# Patient Record
Sex: FEMALE | Race: WHITE | NOT HISPANIC OR LATINO | Employment: UNEMPLOYED | URBAN - METROPOLITAN AREA
[De-identification: names, ages, dates, MRNs, and addresses within clinical notes are randomized per-mention and may not be internally consistent; named-entity substitution may affect disease eponyms.]

---
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
## 2024-01-08 DIAGNOSIS — Z30.09 BIRTH CONTROL COUNSELING: ICD-10-CM

## 2024-01-08 RX ORDER — DROSPIRENONE AND ETHINYL ESTRADIOL 0.02-3(28)
1 KIT ORAL DAILY
Qty: 28 TABLET | Refills: 0 | Status: SHIPPED | OUTPATIENT
Start: 2024-01-08

## 2024-02-10 DIAGNOSIS — Z30.09 BIRTH CONTROL COUNSELING: ICD-10-CM

## 2024-02-12 RX ORDER — DROSPIRENONE AND ETHINYL ESTRADIOL 0.02-3(28)
1 KIT ORAL DAILY
Qty: 28 TABLET | Refills: 0 | Status: SHIPPED | OUTPATIENT
Start: 2024-02-12

## 2024-06-01 NOTE — ASSESSMENT & PLAN NOTE
Doing well on vyvanse  Continue current meds  Please  your prescription at your local pharmacy and administer on Monday, 6/3, midday.     Please return to the ED if patient develops stridor at rest, excessive drooling, difficulty breathing (retractions, breathing fast), inability to swallow, or decreased urination.     Please provide patient supportive care, such as tylenol or motrin for pain or fever.     May consider humidifier and nasal saline and bulb suction/Nasal Trinidad for congestion.    May consider honey for cough if child is >1 year old. DO NOT GIVE HONEY IN INFANTS <1 YEAR AS THIS IS A RISK FOR BOTULINUM TOXICITY.     Ensure patient is well hydrated. Escalate food intake as tolerated.     If patient is experiencing a cough, this cough may resolve with other symptoms. Very often; however, a cough from a viral infection may persist for up to 2 weeks after initial infection. They are not infective during this time, their lungs are simply taking time to recover from the illness.    If after a 3-5 days, your child's symptoms worsen after initially seeming to improve or do not improve, please consider re-evaluation by your PCP or ED. For example, if your child suddenly appears clinically unwell, such as sudden-onset and significantly worsened difficulty with breathing, increase in vomiting, diarrhea, change in sound of cough, or if he/she begins to spike new high fevers despite tylenol/ motrin dosing, please bring patient to a clinician for reevaluation.

## 2024-11-14 DIAGNOSIS — J45.20 INTERMITTENT ASTHMA, UNSPECIFIED ASTHMA SEVERITY, UNSPECIFIED WHETHER COMPLICATED: ICD-10-CM

## 2024-11-14 DIAGNOSIS — I15.1 HYPERTENSION SECONDARY TO OTHER RENAL DISORDERS: ICD-10-CM

## 2024-11-14 RX ORDER — LISINOPRIL 10 MG/1
10 TABLET ORAL DAILY
Qty: 90 TABLET | Refills: 0 | Status: SHIPPED | OUTPATIENT
Start: 2024-11-14

## 2024-11-14 RX ORDER — ALBUTEROL SULFATE 90 UG/1
2 INHALANT RESPIRATORY (INHALATION) EVERY 4 HOURS PRN
Qty: 18 G | Refills: 0 | Status: SHIPPED | OUTPATIENT
Start: 2024-11-14

## 2024-12-12 NOTE — PATIENT INSTRUCTIONS
1  Hypertension: Will continue on 10 mg for now and have dad check BPs at home  If blood pressures continue to remain elevated, will plan to increase to 15 mg daily  Will plan follow up depending on BP results 
DISPLAY PLAN FREE TEXT
DISPLAY PLAN FREE TEXT

## 2024-12-16 DIAGNOSIS — J45.20 INTERMITTENT ASTHMA, UNSPECIFIED ASTHMA SEVERITY, UNSPECIFIED WHETHER COMPLICATED: ICD-10-CM

## 2024-12-17 RX ORDER — ALBUTEROL SULFATE 90 UG/1
INHALANT RESPIRATORY (INHALATION)
Qty: 18 G | Refills: 0 | Status: SHIPPED | OUTPATIENT
Start: 2024-12-17

## 2025-01-23 ENCOUNTER — ANNUAL EXAM (OUTPATIENT)
Age: 20
End: 2025-01-23
Payer: COMMERCIAL

## 2025-01-23 VITALS
DIASTOLIC BLOOD PRESSURE: 78 MMHG | BODY MASS INDEX: 32.78 KG/M2 | HEIGHT: 63 IN | SYSTOLIC BLOOD PRESSURE: 112 MMHG | WEIGHT: 185 LBS

## 2025-01-23 DIAGNOSIS — Z30.41 SURVEILLANCE OF CONTRACEPTIVE PILL: ICD-10-CM

## 2025-01-23 DIAGNOSIS — Z01.419 ENCOUNTER FOR GYNECOLOGICAL EXAMINATION WITHOUT ABNORMAL FINDING: Primary | ICD-10-CM

## 2025-01-23 PROBLEM — Q61.3 POLYCYSTIC KIDNEY DISEASE: Status: RESOLVED | Noted: 2018-03-24 | Resolved: 2025-01-23

## 2025-01-23 PROBLEM — I15.1 HYPERTENSION SECONDARY TO OTHER RENAL DISORDERS: Status: RESOLVED | Noted: 2019-02-08 | Resolved: 2025-01-23

## 2025-01-23 PROBLEM — R45.4 DIFFICULTY CONTROLLING ANGER: Status: RESOLVED | Noted: 2020-03-10 | Resolved: 2025-01-23

## 2025-01-23 PROCEDURE — S0612 ANNUAL GYNECOLOGICAL EXAMINA: HCPCS | Performed by: NURSE PRACTITIONER

## 2025-01-23 RX ORDER — DROSPIRENONE AND ETHINYL ESTRADIOL 0.02-3(28)
1 KIT ORAL DAILY
Qty: 28 TABLET | Refills: 12 | Status: SHIPPED | OUTPATIENT
Start: 2025-01-23

## 2025-01-23 RX ORDER — LEVONORGESTREL AND ETHINYL ESTRADIOL 0.15-0.03
KIT ORAL
COMMUNITY
Start: 2024-10-02 | End: 2025-01-23 | Stop reason: ALTCHOICE

## 2025-01-23 NOTE — PATIENT INSTRUCTIONS
Patient Education     STI Prevention   About this topic   Sexually transmitted infections or STis are infections you catch during sexual contact. This includes vaginal, oral, and anal sex. You may also get it by coming in contact with skin, genitals, mouth, rectum, or body fluids of an infected person. A person with an STI may not have any signs or know they have it. STIs can be very serious and have long-term health effects. STIs can cause cancer, blindness, or not being able to have children.  Bacteria, viruses, or parasites can cause STIs. Bacterial STIs are treated with antibiotics. Only the signs of viral STIs are treated. Common STIs include:  Chlamydia   Gonorrhea  Syphilis  Human immunodeficiency virus (HIV)  Herpes simplex  Human papillomavirus (HPV)  Hepatitis B and C  Trichomonas  STIs may cause signs like:  Pain when passing urine  Sores or genital warts  Unusual discharge from penis or vagina  Itching on your inner thighs, anus, or genitals  Abnormal menstrual bleeding  Pain or bleeding during sex  Swelling of testicles  Fever  Muscle or joint pain  These signs may come and go. It is important to see your doctor even if you think the signs are gone.  General   Learn about your health, your body, sex, love, and relationships. These are all important parts of sexual health.  Learn about STIs. Find reliable information about STIs.  Know the right names for both male and female body parts.  Find information about the kinds of infections you could get.  Know how STIs spread as well as the signs and treatment.     What will the results be?   You may be able to protect yourself from getting STIs.  You may be able to prevent long-term effects on your health.  If you are pregnant, you may be able to prevent giving your unborn baby a STI.  Will there be any other care needed?   Ask your doctor if there are shots or pills you can take to prevent a STI.  Know your STI status. Get tested and have your partner  tested.  What can be done to prevent this health problem?   The only sure way to keep from getting or passing on a sexually transmitted infection is to not have sexual contact with anyone.  Even if you do not have any signs of illness, you may spread an STI.  Having an STI can increase your chances of catching HIV, the virus that causes AIDS.  If you have any type of sexual contact, use latex condoms each time to reduce the spread of infection. Use a condom with each partner every time, from the start of sex to the end.  Avoid contact with any sex partner known to have an infection or who has signs of an infection like genital sores or warts.  Avoid multiple sex partners. A long-term monogamous relationship with a partner who has tested negative and is known to have no infection is the safest partner.  If you are pregnant, get tested and get prompt treatment for an STI. This will help avoid passing it to your baby.  Avoid using drugs or too much alcohol. Either of these can lead to risky behavior like unprotected sex.  Talk to your partner about STIs before you have sex. Talk about having safe sex and if your relationship is monogamous.  Wash your hands and genitals with soap and water after sex.  See your doctor for regular exams and check-ups for STIs.  Talk to your doctor about available vaccines for prevention of certain STIs.  Last Reviewed Date   2024-02-01  Consumer Information Use and Disclaimer   This generalized information is a limited summary of diagnosis, treatment, and/or medication information. It is not meant to be comprehensive and should be used as a tool to help the user understand and/or assess potential diagnostic and treatment options. It does NOT include all information about conditions, treatments, medications, side effects, or risks that may apply to a specific patient. It is not intended to be medical advice or a substitute for the medical advice, diagnosis, or treatment of a health care  provider based on the health care provider's examination and assessment of a patient’s specific and unique circumstances. Patients must speak with a health care provider for complete information about their health, medical questions, and treatment options, including any risks or benefits regarding use of medications. This information does not endorse any treatments or medications as safe, effective, or approved for treating a specific patient. [employer] and its affiliates disclaim any warranty or liability relating to this information or the use thereof. The use of this information is governed by the Terms of Use, available at https://www.[employer].com/en/know/clinical-effectiveness-terms   Copyright   Copyright © 2024 [employer] and its affiliates and/or licensors. All rights reserved.

## 2025-01-23 NOTE — PROGRESS NOTES
Diagnoses and all orders for this visit:    Encounter for gynecological examination without abnormal finding    Surveillance of contraceptive pill  -     drospirenone-ethinyl estradiol (CAITLYN) 3-0.02 MG per tablet; Take 1 tablet by mouth daily    Other orders  -     Discontinue: Altavera 0.15-30 MG-MCG per tablet    Calcium/vit d inclusion in the diet discussed, call with any issues, SBE reinforced, all concerns addressed.             Pleasant 19 y.o. premenopausal female here for annual exam. She denies any issues with bleeding or her menses. Reports regular cycles that last 4-5 days. She did have an untinentional pregnancy which resulted in a TOP (had stopped taking it for a month). She was started on Altavera but would like to go back on Caitlyn.  She has had 2 Abortions (Medical) 05/2023 and D&E in 10/2024 in [facility]  by [facility]. Denies vaginal issues. Denies pelvic pain. She has a history of HTN and PCKD, no meds taken. . She declines switching to a non-estrogen method, Risks and benefits discussed. Advised to consider an alternative non-estrogen method if her BP becomes an issue. Gardasil series received x 3. Sexually active without any concerns. Monogamous x 6 years. STD screen for GC/CT negative 9/2023.  Patient declines a recheck. Advised risks of untreated chlamydia and gonorrhea which include but are not limited to infertility, PID, abscess etc. Patient verbalized understanding.    Past Medical History:   Diagnosis Date    Allergic     Anxiety     Asthma     Chronic kidney disease     Depression     Hypertension     Hypertension secondary to other renal disorders 02/08/2019    Polycystic kidney disease      Past Surgical History:   Procedure Laterality Date    INCISION AND DRAINAGE ABSCESS / HEMATOMA OF BURSA / KNEE / THIGH      of skin abscess knee. last assessed: 8/30/2017     Family History   Problem Relation Age of Onset    Kidney disease Father         transplant    Hypertension Father      Polycystic kidney disease Father     Alcohol abuse Father     Hypertension Paternal Grandmother     Polycystic kidney disease Paternal Grandmother     Kidney failure Paternal Grandmother     Hyperlipidemia Paternal Grandmother     Proteinuria Paternal Grandmother     Polycystic kidney disease Paternal Uncle     Breast cancer Neg Hx     Colon cancer Neg Hx     Ovarian cancer Neg Hx     Uterine cancer Neg Hx     Cervical cancer Neg Hx      Social History     Tobacco Use    Smoking status: Never    Smokeless tobacco: Never   Vaping Use    Vaping status: Never Used   Substance Use Topics    Alcohol use: Yes     Alcohol/week: 3.0 standard drinks of alcohol     Types: 3 Standard drinks or equivalent per week     Comment: only on occasion    Drug use: Yes     Frequency: 7.0 times per week     Types: Marijuana     Comment: i use for anxiety and to help me sleep       Current Outpatient Medications:     albuterol (PROVENTIL HFA,VENTOLIN HFA) 90 mcg/act inhaler, TAKE 2 PUFFS BY MOUTH EVERY 4 HOURS AS NEEDED FOR WHEEZE, Disp: 18 g, Rfl: 0    drospirenone-ethinyl estradiol (CAITLYN) 3-0.02 MG per tablet, Take 1 tablet by mouth daily, Disp: 28 tablet, Rfl: 12  Patient Active Problem List    Diagnosis Date Noted    Intermittent asthma 2023    Surveillance of contraceptive pill 2022    Depression with anxiety 2018    Proteinuria 2018    Attention deficit hyperactivity disorder (ADHD), combined type 2018       Allergies   Allergen Reactions    Bee Pollen     Cat Hair Extract     Pineapple - Food Allergy     Pollen Extract        OB History    Para Term  AB Living   2 0 0 0 2 0   SAB IAB Ectopic Multiple Live Births   0 0 0 0 0      # Outcome Date GA Lbr Jorgito/2nd Weight Sex Type Anes PTL Lv   2 AB            1 AB              Works full time for [employer] in [place]-12 hour night shifts    Vitals:    25 1102   BP: 112/78   BP Location: Left arm   Patient Position: Sitting   Cuff Size:  "Standard   Weight: 83.9 kg (185 lb)   Height: 5' 3\" (1.6 m)       Body mass index is 32.77 kg/m².    Review of Systems   Constitutional: Negative for chills, fatigue, fever and unexpected weight change.   Respiratory: Negative for shortness of breath.    Gastrointestinal: Negative for anal bleeding, blood in stool, constipation and diarrhea.   Genitourinary: Negative for difficulty urinating, dysuria and hematuria.     Physical Exam   Constitutional: She appears well-developed and well-nourished. No distress.   HENT: atraumatic, EOMI  Head: Normocephalic.   Neck: Normal range of motion. Neck supple.   Pulmonary: Effort normal.  Breasts: bilateral without masses, skin changes or nipple discharge. Bilaterally soft and warm to touch. No areas of erythema or pain.  Abdominal: Soft.   Pelvic exam was performed with patient supine. No labial fusion. There is no rash, tenderness, lesion or injury on the right labia. There is no rash, tenderness, lesion or injury on the left labia. Uterus is not deviated, not enlarged, not fixed and not tender. Cervix exhibits no motion tenderness, no discharge and no friability. Right adnexum displays no mass, no tenderness and no fullness. Left adnexum displays no mass, no tenderness and no fullness. No erythema or tenderness in the vagina. No foreign body in the vagina. No signs of injury around the vagina. Small amount of vaginal discharge found.   Lymphadenopathy:        Right: No inguinal adenopathy present.        Left: No inguinal adenopathy present.         "

## 2025-01-27 DIAGNOSIS — J45.20 INTERMITTENT ASTHMA, UNSPECIFIED ASTHMA SEVERITY, UNSPECIFIED WHETHER COMPLICATED: ICD-10-CM

## 2025-01-27 RX ORDER — ALBUTEROL SULFATE 90 UG/1
INHALANT RESPIRATORY (INHALATION)
OUTPATIENT
Start: 2025-01-27

## 2025-02-07 NOTE — TELEPHONE ENCOUNTER
Tried to call patient to schedule an appointment for medication refills. Voicemail full.         [employer] message sent, letter mailed.

## 2025-02-16 DIAGNOSIS — Z30.41 SURVEILLANCE OF CONTRACEPTIVE PILL: ICD-10-CM

## 2025-02-17 RX ORDER — DROSPIRENONE AND ETHINYL ESTRADIOL 0.02-3(28)
1 KIT ORAL DAILY
Qty: 84 TABLET | Refills: 1 | Status: SHIPPED | OUTPATIENT
Start: 2025-02-17

## 2025-04-16 NOTE — ED NOTES
----- Message from Jonathan sent at 4/16/2025 10:13 AM CDT -----  Regarding: advice  Type:  Needs Medical AdviceWho Called: Tera Call Back Number: [phone] Information: pt needs to reschedule her stress test.  please call to discuss.   Pt d/c by provider       Nirav Traore RN  10/24/19 [id number]

## 2025-05-15 NOTE — PATIENT INSTRUCTIONS
Discussed all with dad and patient  Stay on the Vyvanse but I would like to see a little bit better marks at school I realize it has been a hard time  Stay on all your other medications  Keep the follow-up appointment with your nephrologist tomorrow  For the swelling in the upper lip either an ice pack as much as you could tolerate or suck on ice pops to make it go down  Blood pressure is acceptable today keep your follow-up appointment with counseling and the nephrologist  I will call with results of hiv testing  Refuses flu shot  96

## 2025-07-29 DIAGNOSIS — Z30.41 SURVEILLANCE OF CONTRACEPTIVE PILL: ICD-10-CM

## 2025-07-30 RX ORDER — DROSPIRENONE AND ETHINYL ESTRADIOL 0.02-3(28)
1 KIT ORAL DAILY
Qty: 84 TABLET | Refills: 1 | Status: SHIPPED | OUTPATIENT
Start: 2025-07-30